# Patient Record
Sex: FEMALE | Race: WHITE | NOT HISPANIC OR LATINO | URBAN - METROPOLITAN AREA
[De-identification: names, ages, dates, MRNs, and addresses within clinical notes are randomized per-mention and may not be internally consistent; named-entity substitution may affect disease eponyms.]

---

## 2017-08-23 ENCOUNTER — IMPORTED ENCOUNTER (OUTPATIENT)
Dept: URBAN - METROPOLITAN AREA CLINIC 50 | Facility: CLINIC | Age: 77
End: 2017-08-23

## 2017-08-29 ENCOUNTER — IMPORTED ENCOUNTER (OUTPATIENT)
Dept: URBAN - METROPOLITAN AREA CLINIC 50 | Facility: CLINIC | Age: 77
End: 2017-08-29

## 2017-09-15 ENCOUNTER — IMPORTED ENCOUNTER (OUTPATIENT)
Dept: URBAN - METROPOLITAN AREA CLINIC 50 | Facility: CLINIC | Age: 77
End: 2017-09-15

## 2017-10-20 ENCOUNTER — IMPORTED ENCOUNTER (OUTPATIENT)
Dept: URBAN - METROPOLITAN AREA CLINIC 50 | Facility: CLINIC | Age: 77
End: 2017-10-20

## 2018-04-19 ENCOUNTER — IMPORTED ENCOUNTER (OUTPATIENT)
Dept: URBAN - METROPOLITAN AREA CLINIC 50 | Facility: CLINIC | Age: 78
End: 2018-04-19

## 2018-08-09 ENCOUNTER — IMPORTED ENCOUNTER (OUTPATIENT)
Dept: URBAN - METROPOLITAN AREA CLINIC 50 | Facility: CLINIC | Age: 78
End: 2018-08-09

## 2018-08-23 ENCOUNTER — IMPORTED ENCOUNTER (OUTPATIENT)
Dept: URBAN - METROPOLITAN AREA CLINIC 50 | Facility: CLINIC | Age: 78
End: 2018-08-23

## 2019-07-08 NOTE — PATIENT DISCUSSION
Patient used to see Dr. Wili Carr and wishes to see a new retina specialist. Refer to Dr. Bridgett Phillips next available.

## 2019-07-16 NOTE — PATIENT DISCUSSION
Pt edu injections unlikely to help at this time due to damage from advanced AMD and scarring. Do not recommend treatment at this time. Will continue to monitor. Advised pt to call with any vision changes. Ok to continue with Preservision AREDS 2 if well tolerated.

## 2019-07-16 NOTE — PATIENT DISCUSSION
Pt states she was previously treated by Dr. Geovanny Lynch, Dr. Pratibha Quiroga office was called and last injection was in May. Pt edu inactive at this time, no fluid seen on exam or OCT. Due to high tendency to return and better seeing eye would recommend Eylea #1 here in 2 weeks. Explained to patient the goal is to maintain current vision, vision will never be great. Explained to patient Dr. Laureano Sheets injection procedure and explained that same day treatments are not performed. Pt verbalized understanding and elects to proceed with treatment in 2 weeks. Pt does not drive and lives @ [address] [facility]. Pt also has been using the [employer].

## 2019-08-01 NOTE — PATIENT DISCUSSION
Eylea #1 injection recommended today after discussion of benefits, risks, alternatives. The injection was administered without complication. Post-injection instructions were reviewed and understood by the patient.

## 2019-08-01 NOTE — PATIENT DISCUSSION
Symptoms of retinal detachment and endophthalmitis following intravitreal injection discussed; patient advised to call immediately if symptoms ensue. No

## 2019-08-01 NOTE — PROCEDURE NOTE: CLINICAL
PROCEDURE NOTE: Eylea 2mg #1 OD. Diagnosis: Neovascular AMD with Active CNV. Prior to injection, risks/benefits/alternatives discussed including corneal abrasion, infection, loss of vision, hemorrhage, cataract, glaucoma, retinal tears or detachment. A written consent is on file, and the need for today’s injection was discussed and the patient is understanding and wishes to proceed. The entire vial of Eylea was drawn up into a syringe. The opened vial, remaining drug, and filtered needle were disposed of in a certified biohazard container. Betadine prep was performed. Topical anesthesia was induced with Alcaine. 4% lidocaine pledge. A lid speculum was used. A short 30g needle on a 1cc syringe was used with product that that had previously been prepared under sterile conditions. Injection site: 3-4 mm from the limbus. The used syringe/needle was transferred to a biohazard container. Lid speculum removed. Mask worn during procedure. Patient tolerated procedure well. Count fingers vision was verified. There were no complications. Patient was given the standard instruction sheet. Patient given office phone number/answering service number and advised to call immediately should there be loss of vision or pain, or should they have any other questions or concerns. Yumiko Arnett

## 2019-08-12 ENCOUNTER — IMPORTED ENCOUNTER (OUTPATIENT)
Dept: URBAN - METROPOLITAN AREA CLINIC 50 | Facility: CLINIC | Age: 79
End: 2019-08-12

## 2019-08-12 NOTE — PATIENT DISCUSSION
"""MAC OCT OU done today. Hx of PPV with MP. Followed by Dr. Zane Larson. Follow ERM w/o surgery.  ""

## 2019-10-08 NOTE — PATIENT DISCUSSION
Pt edu improved, no fluid seen on exam or OCT today. Due to high tendency to return and only good seeing eye would would recommend Eylea #2 in 2 weeks. Pt elects to proceed.

## 2019-10-23 NOTE — PATIENT DISCUSSION
IOP Above NL in OS today. Would recommend using Simbrinza BID OU as directed in past.  Pt only taking OD right now, compliance stressed.

## 2019-10-23 NOTE — PROCEDURE NOTE: CLINICAL
PROCEDURE NOTE: Eylea 2mg #2 OD. Diagnosis: Neovascular AMD with Active CNV. Prep: Betadine Drops and Betadine Scrub. Prior to injection, risks/benefits/alternatives discussed including corneal abrasion, infection, loss of vision, hemorrhage, cataract, glaucoma, retinal tears or detachment. A written consent is on file, and the need for today’s injection was discussed and the patient is understanding and wishes to proceed. The entire vial of Eylea was drawn up into a syringe. The opened vial, remaining drug, and filtered needle were disposed of in a certified biohazard container. Betadine prep was performed. Topical anesthesia was induced with Alcaine. 4% lidocaine pledge. A lid speculum was used. A short 30g needle on a 1cc syringe was used with product that that had previously been prepared under sterile conditions. Injection site: 3-4 mm from the limbus. The used syringe/needle was transferred to a biohazard container. Lid speculum removed. Mask worn during procedure. Patient tolerated procedure well. Count fingers vision was verified. There were no complications. Patient was given the standard instruction sheet. Patient given office phone number/answering service number and advised to call immediately should there be loss of vision or pain, or should they have any other questions or concerns. Vivienne Beach

## 2019-12-31 NOTE — PATIENT DISCUSSION
Patient understands condition, prognosis and need for follow up care.  CPM's again compliance stressed and rec cont. gtts OU not just OD.

## 2019-12-31 NOTE — PATIENT DISCUSSION
No new active leakage or blood.  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  At this time re obsrevation and consider tx if worsens in th future.  Goal of tx edu.

## 2020-05-07 NOTE — PATIENT DISCUSSION
Pt edu injections unlikely to help at this time due to damage from advanced AMD and scarring. Do not recommend treatment at this time. Will continue to monitor. Advised pt to call with any vision changes. Ok to d/c AREDS 2 vits since pt is over 82 yrs old and has WET AMD OU.

## 2020-05-14 ENCOUNTER — IMPORTED ENCOUNTER (OUTPATIENT)
Dept: URBAN - METROPOLITAN AREA CLINIC 50 | Facility: CLINIC | Age: 80
End: 2020-05-14

## 2020-05-14 NOTE — PATIENT DISCUSSION
"""Dry Eye Syndrome.  Recommend lubrication with artificial tears and flax seed oil 1000mg orally ""

## 2020-07-23 ENCOUNTER — PREPPED CHART (OUTPATIENT)
Dept: URBAN - METROPOLITAN AREA CLINIC 52 | Facility: CLINIC | Age: 80
End: 2020-07-23

## 2020-07-23 ENCOUNTER — IMPORTED ENCOUNTER (OUTPATIENT)
Dept: URBAN - METROPOLITAN AREA CLINIC 50 | Facility: CLINIC | Age: 80
End: 2020-07-23

## 2020-08-11 NOTE — PATIENT DISCUSSION
As advised in the past pt will need Low Vision aides and extra light for any kind of close work or reading.  per caretaker on phone pt has tried the [address] [place] [address] for help with ADL's and Low Vision tools but does not wish to use them.

## 2020-08-11 NOTE — PATIENT DISCUSSION
Patient understands condition, prognosis and need for follow up care.  CPM's again compliance stressed and rec cont. gtts OU not just OD BID.  Pt' caretaker states MUNIR has not been giving pt gtts and needs written order/rx.  Written order given for Simbrinzia BID OU w/ 11 refills.

## 2020-08-11 NOTE — PATIENT DISCUSSION
No new active leakage or blood.  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  At this time rec obsrevation and consider tx if worsens in th future.  Goal of tx edu.

## 2020-11-10 NOTE — PATIENT DISCUSSION
Pt edu injections unlikely to help at this time due to damage from advanced AMD and scarring. Do not recommend treatment at this time. Will continue to monitor. Advised pt to call with any vision changes.

## 2020-11-10 NOTE — PATIENT DISCUSSION
No new active leakage or blood.  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  At this time rec observation and consider tx if worsens in the future.  Will follow up in 4 months.

## 2021-04-08 ASSESSMENT — VISUAL ACUITY
OD_SC: 20/25-1
OD_CC: 20/25-
OS_SC: 20/40
OS_CC: 20/25-2

## 2021-04-08 ASSESSMENT — TONOMETRY
OS_IOP_MMHG: 13
OD_IOP_MMHG: 13

## 2021-04-18 ASSESSMENT — VISUAL ACUITY
OD_CC: J1+
OS_CC: 20/25-2
OD_CC: 20/25-
OS_CC: 20/30
OD_CC: 20/40
OD_CC: 20/20
OD_CC: J1+@ 15 IN
OS_CC: 20/30
OS_CC: J1+
OS_CC: J1+@ 15 IN
OD_PH: 20/30
OS_CC: 20/20
OD_CC: 20/30
OD_CC: J1+
OS_CC: 20/30-1
OD_CC: 20/30
OS_CC: J1+
OS_CC: 20/20
OD_CC: 20/25

## 2021-04-18 ASSESSMENT — TONOMETRY
OS_IOP_MMHG: 13
OD_IOP_MMHG: 15
OS_IOP_MMHG: 14
OD_IOP_MMHG: 14
OD_IOP_MMHG: 13
OS_IOP_MMHG: 13
OS_IOP_MMHG: 14
OS_IOP_MMHG: 13
OD_IOP_MMHG: 14
OD_IOP_MMHG: 13
OD_IOP_MMHG: 13
OS_IOP_MMHG: 15

## 2021-06-07 ENCOUNTER — COMPREHENSIVE EXAM (OUTPATIENT)
Dept: URBAN - METROPOLITAN AREA CLINIC 53 | Facility: CLINIC | Age: 81
End: 2021-06-07

## 2021-06-07 DIAGNOSIS — H16.223: ICD-10-CM

## 2021-06-07 DIAGNOSIS — H43.812: ICD-10-CM

## 2021-06-07 DIAGNOSIS — H35.373: ICD-10-CM

## 2021-06-07 PROCEDURE — 92014 COMPRE OPH EXAM EST PT 1/>: CPT

## 2021-06-07 PROCEDURE — 92134 CPTRZ OPH DX IMG PST SGM RTA: CPT

## 2021-06-07 PROCEDURE — 92015 DETERMINE REFRACTIVE STATE: CPT

## 2021-06-07 ASSESSMENT — KERATOMETRY
OS_AXISANGLE2_DEGREES: 180
OS_K2POWER_DIOPTERS: 45.25
OD_K1POWER_DIOPTERS: 44.75
OS_K1POWER_DIOPTERS: 45.25
OS_AXISANGLE_DEGREES: 090
OD_AXISANGLE_DEGREES: 090
OD_AXISANGLE2_DEGREES: 180
OD_K2POWER_DIOPTERS: 44.75

## 2021-06-07 ASSESSMENT — VISUAL ACUITY
OU_CC: J2
OS_CC: 20/30
OD_CC: 20/30

## 2021-06-07 ASSESSMENT — TONOMETRY
OD_IOP_MMHG: 14
OS_IOP_MMHG: 14

## 2022-03-23 ENCOUNTER — ESTABLISHED PATIENT (OUTPATIENT)
Dept: URBAN - METROPOLITAN AREA CLINIC 49 | Facility: CLINIC | Age: 82
End: 2022-03-23

## 2022-03-23 DIAGNOSIS — H04.121: ICD-10-CM

## 2022-03-23 DIAGNOSIS — H04.221: ICD-10-CM

## 2022-03-23 PROCEDURE — 92012 INTRM OPH EXAM EST PATIENT: CPT

## 2022-03-23 ASSESSMENT — TONOMETRY
OD_IOP_MMHG: 10
OS_IOP_MMHG: 10

## 2022-03-23 ASSESSMENT — VISUAL ACUITY
OS_CC: 20/40-2
OD_CC: 20/30-1

## 2022-03-23 NOTE — PATIENT DISCUSSION
Discussed with patient.  patient has punctal stenosis right lower lid.  recommend patient have a consult with Dr Rick Crawford for an evaluation.  for a possible 3 snip punctoplasty.

## 2022-03-23 NOTE — PATIENT DISCUSSION
Discussed with patient.  Recommend patient use artificial tears at least 3 times a day or more. Carpopedal spasm

## 2022-04-20 ENCOUNTER — CONSULTATION/EVALUATION (OUTPATIENT)
Dept: URBAN - METROPOLITAN AREA CLINIC 49 | Facility: CLINIC | Age: 82
End: 2022-04-20

## 2022-04-20 DIAGNOSIS — H04.221: ICD-10-CM

## 2022-04-20 DIAGNOSIS — H04.121: ICD-10-CM

## 2022-04-20 PROCEDURE — 92012 INTRM OPH EXAM EST PATIENT: CPT

## 2022-04-20 ASSESSMENT — VISUAL ACUITY
OD_CC: 20/30-
OS_CC: 20/30-

## 2022-04-20 ASSESSMENT — TONOMETRY
OD_IOP_MMHG: 14
OS_IOP_MMHG: 14

## 2022-06-08 ENCOUNTER — FOLLOW UP (OUTPATIENT)
Dept: URBAN - METROPOLITAN AREA CLINIC 49 | Facility: CLINIC | Age: 82
End: 2022-06-08

## 2022-06-08 DIAGNOSIS — H04.123: ICD-10-CM

## 2022-06-08 DIAGNOSIS — H04.223: ICD-10-CM

## 2022-06-08 PROCEDURE — 92012 INTRM OPH EXAM EST PATIENT: CPT

## 2022-06-08 ASSESSMENT — TONOMETRY
OS_IOP_MMHG: 10
OD_IOP_MMHG: 10

## 2022-06-08 ASSESSMENT — VISUAL ACUITY
OD_CC: 20/25
OU_CC: 20/25
OS_CC: 20/40-1

## 2022-06-08 NOTE — PATIENT DISCUSSION
Explained to the patient that ST is hesitant on doing punctal flush to see if it is closed as it can cause more dryness because it will be draining the tear out more than it is now.

## 2022-08-16 ENCOUNTER — FOLLOW UP (OUTPATIENT)
Dept: URBAN - METROPOLITAN AREA CLINIC 52 | Facility: CLINIC | Age: 82
End: 2022-08-16

## 2022-08-16 DIAGNOSIS — H04.223: ICD-10-CM

## 2022-08-16 DIAGNOSIS — H10.13: ICD-10-CM

## 2022-08-16 DIAGNOSIS — H04.123: ICD-10-CM

## 2022-08-16 PROCEDURE — 92012 INTRM OPH EXAM EST PATIENT: CPT

## 2022-08-16 RX ORDER — OLOPATADINE HYDROCHLORIDE 2 MG/ML: 1 SOLUTION OPHTHALMIC

## 2022-08-16 ASSESSMENT — VISUAL ACUITY
OD_CC: 20/25
OS_CC: 20/40

## 2022-08-16 ASSESSMENT — TONOMETRY
OD_IOP_MMHG: 15
OS_IOP_MMHG: 14

## 2022-08-16 NOTE — PATIENT DISCUSSION
Advised against water proof mascara as it is making her dry eye worse. Recommended she use IT! Superhero or she can use L'oreal Voluminous. She uses Duy Radar make up remover. She does use Jennifer Fructis micellar water. Advised she discontinue the use of both of those make up removers and begin using Erase Your Face, which can be found on [employer].  Will follow up with her dry eye at her annual exam.

## 2022-08-16 NOTE — PATIENT DISCUSSION
Her eyes always bother her while she is reading. They fill up with water. She has to stop and rest for a while. She had been on Claritin D but never thought of allergies impacting the eyes as well. Dr. Jadyn Larson sees her every 6 months and she was telling him about all of her ocular problems. He told her that he believes she has allergies in her eyes. Dr. Nicolas ySlvester told her to switch from Claritin D to Guerraview. She started the Allegra today and has already noticed improvement. Discussed that we do advise every year to switch up her allergy medication. She was using Restasis for her dry eye drop per Dr. Jairo Jasso. She then saw Mike Gomez and he told her to use Blink and does not think it is working.

## 2023-01-04 ENCOUNTER — COMPREHENSIVE EXAM (OUTPATIENT)
Dept: URBAN - METROPOLITAN AREA CLINIC 52 | Facility: CLINIC | Age: 83
End: 2023-01-04

## 2023-01-04 DIAGNOSIS — H35.353: ICD-10-CM

## 2023-01-04 DIAGNOSIS — H04.123: ICD-10-CM

## 2023-01-04 DIAGNOSIS — H04.223: ICD-10-CM

## 2023-01-04 PROCEDURE — 92134 CPTRZ OPH DX IMG PST SGM RTA: CPT

## 2023-01-04 PROCEDURE — 92014 COMPRE OPH EXAM EST PT 1/>: CPT

## 2023-01-04 ASSESSMENT — TONOMETRY
OD_IOP_MMHG: 08
OS_IOP_MMHG: 09

## 2023-01-04 ASSESSMENT — VISUAL ACUITY
OU_CC: J2
OD_CC: 20/30-2
OS_CC: 20/30-2
OD_PH: 20/30-1

## 2023-01-04 NOTE — PATIENT DISCUSSION
Followed by Dr. Guanako Mcnair. We will see her annually because she follows Dr. Guanako Mcnair so closely.

## 2024-05-08 ENCOUNTER — COMPREHENSIVE EXAM (OUTPATIENT)
Dept: URBAN - METROPOLITAN AREA CLINIC 49 | Facility: LOCATION | Age: 84
End: 2024-05-08

## 2024-05-08 DIAGNOSIS — H43.812: ICD-10-CM

## 2024-05-08 DIAGNOSIS — H16.223: ICD-10-CM

## 2024-05-08 DIAGNOSIS — H35.373: ICD-10-CM

## 2024-05-08 DIAGNOSIS — H04.123: ICD-10-CM

## 2024-05-08 DIAGNOSIS — H04.223: ICD-10-CM

## 2024-05-08 DIAGNOSIS — H35.353: ICD-10-CM

## 2024-05-08 PROCEDURE — 92015 DETERMINE REFRACTIVE STATE: CPT

## 2024-05-08 PROCEDURE — 99214 OFFICE O/P EST MOD 30 MIN: CPT

## 2024-05-08 PROCEDURE — 92134 CPTRZ OPH DX IMG PST SGM RTA: CPT

## 2024-05-08 ASSESSMENT — VISUAL ACUITY
OS_CC: 20/70
OU_CC: J1

## 2024-05-08 ASSESSMENT — TONOMETRY
OD_IOP_MMHG: 13
OS_IOP_MMHG: 13

## 2025-04-29 NOTE — PATIENT DISCUSSION
Observe. [Follow-Up] : Your patient, [unfilled] was seen in my office today for follow-up [Please see my note below.] : Please see my note below.